# Patient Record
Sex: FEMALE | Race: WHITE
[De-identification: names, ages, dates, MRNs, and addresses within clinical notes are randomized per-mention and may not be internally consistent; named-entity substitution may affect disease eponyms.]

---

## 2019-04-04 NOTE — NUR
History, Chart, Medications and Allergies reviewed before start of
procedure.Patient confirms NPO status and agrees with scheduled surgery.
Lungs clear T/O to Auscultation.

## 2019-04-04 NOTE — NUR
PT TOLERATED PO FLUIDS WELL.  OCCASIONAL BURPING.  DENIES PAIN.
Discharge instructions reviewed with patient. Patient verbalizes understanding.
Copy given to patient to take home.
Patient States Post-Procedure ride home has been arranged.

## 2019-04-04 NOTE — NUR
04/04/19 1057 Laureano Basurto
History, Chart, Medications and Allergies reviewed before start of
procedure.MONITOR INTACT WITH CONTINUOUS PULSE OXIMETRY AND
INTERMITTENT BP.3-LEAD EKG REVIEWED WITH PHYSICIAN PRIOR TO START OF
PROCEDURE.O2 VIA N/C INTACT THROUGHOUT SEDATION/PROCEDURE. Patient
confirms NPO status and agrees with scheduled surgery.PATIENT
DETERMINED TO BE ASA APPROPRIATE FOR PROPOFOL SEDATION PRIOR TO START
OF PROCEDURE BY DR. THACKER.

## 2022-01-14 ENCOUNTER — HOSPITAL ENCOUNTER (OUTPATIENT)
Dept: HOSPITAL 95 - LAB SHORT | Age: 32
End: 2022-01-14
Attending: ADVANCED PRACTICE MIDWIFE
Payer: COMMERCIAL

## 2022-01-14 DIAGNOSIS — N89.8: Primary | ICD-10-CM
